# Patient Record
Sex: MALE | Race: WHITE | NOT HISPANIC OR LATINO | Employment: PART TIME | URBAN - METROPOLITAN AREA
[De-identification: names, ages, dates, MRNs, and addresses within clinical notes are randomized per-mention and may not be internally consistent; named-entity substitution may affect disease eponyms.]

---

## 2020-09-30 ENCOUNTER — HOSPITAL ENCOUNTER (EMERGENCY)
Facility: HOSPITAL | Age: 55
Discharge: HOME | End: 2020-09-30
Attending: EMERGENCY MEDICINE
Payer: MEDICARE

## 2020-09-30 VITALS
TEMPERATURE: 99 F | WEIGHT: 225 LBS | HEART RATE: 86 BPM | RESPIRATION RATE: 18 BRPM | SYSTOLIC BLOOD PRESSURE: 142 MMHG | OXYGEN SATURATION: 97 % | DIASTOLIC BLOOD PRESSURE: 76 MMHG | BODY MASS INDEX: 33.33 KG/M2 | HEIGHT: 69 IN

## 2020-09-30 DIAGNOSIS — M25.561 ACUTE PAIN OF RIGHT KNEE: Primary | ICD-10-CM

## 2020-09-30 DIAGNOSIS — R52 PAIN: ICD-10-CM

## 2020-09-30 PROCEDURE — 99284 EMERGENCY DEPT VISIT MOD MDM: CPT | Mod: 25

## 2020-09-30 PROCEDURE — 25000003 PHARM REV CODE 250: Performed by: NURSE PRACTITIONER

## 2020-09-30 PROCEDURE — 63600175 PHARM REV CODE 636 W HCPCS: Performed by: NURSE PRACTITIONER

## 2020-09-30 PROCEDURE — 96372 THER/PROPH/DIAG INJ SC/IM: CPT | Mod: 59

## 2020-09-30 RX ORDER — NAPROXEN 500 MG/1
500 TABLET ORAL EVERY 12 HOURS PRN
Qty: 14 TABLET | Refills: 0 | Status: SHIPPED | OUTPATIENT
Start: 2020-09-30

## 2020-09-30 RX ORDER — TRAMADOL HYDROCHLORIDE 50 MG/1
50 TABLET ORAL EVERY 6 HOURS PRN
Qty: 6 TABLET | Refills: 0 | Status: SHIPPED | OUTPATIENT
Start: 2020-09-30 | End: 2020-10-10

## 2020-09-30 RX ORDER — OXYCODONE AND ACETAMINOPHEN 5; 325 MG/1; MG/1
1 TABLET ORAL
Status: COMPLETED | OUTPATIENT
Start: 2020-09-30 | End: 2020-09-30

## 2020-09-30 RX ORDER — KETOROLAC TROMETHAMINE 30 MG/ML
60 INJECTION, SOLUTION INTRAMUSCULAR; INTRAVENOUS
Status: COMPLETED | OUTPATIENT
Start: 2020-09-30 | End: 2020-09-30

## 2020-09-30 RX ADMIN — OXYCODONE AND ACETAMINOPHEN 1 TABLET: 5; 325 TABLET ORAL at 08:09

## 2020-09-30 RX ADMIN — KETOROLAC TROMETHAMINE 60 MG: 30 INJECTION, SOLUTION INTRAMUSCULAR at 08:09

## 2020-10-01 NOTE — DISCHARGE INSTRUCTIONS
Take naproxen for pain control.  Utilize the Ultram for breakthrough pain.  Utilize crutches to help with walking.  It is imperative that she follow up with Orthopedics for further evaluation of this knee pain.  Follow-up with your primary care provider.  Return to the ER if symptoms acutely worsen.

## 2020-10-01 NOTE — ED PROVIDER NOTES
Encounter Date: 9/30/2020       History     Chief Complaint   Patient presents with    Knee Pain     RT, X SEVERAL WEEKS     55-year-old white male presents to the emergency department for evaluation of knee pain.  The patient has had pain in his right knee for the past 3 weeks.  He denies any accident or injury.  The patient has been evaluated by primary care after going to the urgent care and told to come to the emergency department for worsening symptoms.  He has complained of constant she 10/10 pain.  Nothing makes the pain worse or better.  The patient has taken naproxen, applied ice, and apply heat without relief of symptoms.  Denies any other symptoms under review of systems.  Patient has allergy to lisinopril.  Medical history of hypertension, coronary artery disease, mi, cardiac stents.  Current daily smoker.  Denies taking any routine medications.    The history is provided by the patient. No  was used.   Knee Pain  This is a new problem. The current episode started more than 1 week ago. The problem occurs constantly. The problem has not changed since onset.Pertinent negatives include no chest pain, no abdominal pain, no headaches and no shortness of breath. The symptoms are aggravated by walking and standing. Nothing relieves the symptoms. He has tried a cold compress and a warm compress (NSAID) for the symptoms. The treatment provided no relief.     Review of patient's allergies indicates:  No Known Allergies  Past Medical History:   Diagnosis Date    Coronary artery disease     Heart attack     Hypertension      Past Surgical History:   Procedure Laterality Date    HEART STENTS       No family history on file.  Social History     Tobacco Use    Smoking status: Current Every Day Smoker   Substance Use Topics    Alcohol use: Not Currently    Drug use: Not on file     Review of Systems   Constitutional: Negative for chills and fever.   HENT: Negative for rhinorrhea and sore  throat.    Respiratory: Negative for cough and shortness of breath.    Cardiovascular: Negative for chest pain.   Gastrointestinal: Negative for abdominal pain, diarrhea, nausea and vomiting.   Genitourinary: Negative for difficulty urinating.   Musculoskeletal: Positive for arthralgias. Negative for back pain, joint swelling and neck pain.   Skin: Negative for rash.   Neurological: Negative for dizziness, weakness and headaches.   All other systems reviewed and are negative.      Physical Exam     Initial Vitals [09/30/20 1720]   BP Pulse Resp Temp SpO2   133/71 74 18 98.4 °F (36.9 °C) 95 %      MAP       --         Physical Exam    Nursing note and vitals reviewed.  Constitutional: He appears well-developed and well-nourished. He is not diaphoretic. No distress.   HENT:   Head: Normocephalic and atraumatic.   Right Ear: External ear normal.   Left Ear: External ear normal.   Nose: Nose normal.   Eyes: Conjunctivae and EOM are normal. Right eye exhibits no discharge. Left eye exhibits no discharge.   Neck: Normal range of motion. Neck supple. No tracheal deviation present. No JVD present.   Cardiovascular: Normal rate, regular rhythm, normal heart sounds and intact distal pulses. Exam reveals no gallop and no friction rub.    No murmur heard.  Pulmonary/Chest: Breath sounds normal. No stridor. No respiratory distress. He has no wheezes. He has no rhonchi. He has no rales. He exhibits no tenderness.   Abdominal: Soft. Bowel sounds are normal. He exhibits no distension and no mass. There is no abdominal tenderness. There is no rebound and no guarding.   Musculoskeletal: Normal range of motion. No edema.      Right knee: He exhibits no bony tenderness. Tenderness found. Medial joint line and patellar tendon tenderness noted.        Legs:    Neurological: He is alert and oriented to person, place, and time. He has normal strength. No sensory deficit. GCS score is 15. GCS eye subscore is 4. GCS verbal subscore is 5. GCS  motor subscore is 6.   Skin: Skin is warm and dry. No rash noted. No erythema.   Psychiatric: He has a normal mood and affect. Thought content normal.         ED Course   Procedures  Labs Reviewed - No data to display       Imaging Results          X-Ray Knee 3 View Right (Final result)  Result time 09/30/20 17:49:00    Final result by Lizzie Stuart MD (09/30/20 17:49:00)                 Narrative:    EXAMINATION:  XR KNEE 3 VIEW RIGHT    CLINICAL HISTORY:  Pain, unspecified    TECHNIQUE:  Postoperative exam, right total knee arthroplasty    COMPARISON:  None.    FINDINGS:  Four views of the right knee show no fractures, dislocations or acute osseous abnormalities.  The joint spaces are maintained.  The soft tissues are unremarkable.    IMPRESSION  Negative right knee      Electronically signed by: Lizzie Stuart MD  Date:    09/30/2020  Time:    17:49                               Medical Decision Making:   Clinical Tests:   Radiological Study: Ordered and Reviewed  ED Management:  No acute findings were noted on patient's radiologic study.  I had a long discussion with the patient regarding follow-up with orthopedics for further radiologic evaluation.  The patient has been given prescriptions for naproxen and tramadol.  He understands he needs to use the medication sparingly.  He has also been fitted with a pair of crutches to aid with ambulation.  He has a knee brace at home that he will utilize for additional support.  Instructed to watch closely for any redness, swelling, inability to weightbear.  Should he notice any of this he will return to the ER for further evaluation.  I have discussed the case with Dr Espinoza, she approves of the plan of care.  Pt is being discharged and will f/u in clinic for further treatment and evaluation.                              Clinical Impression:       ICD-10-CM ICD-9-CM   1. Acute pain of right knee  M25.561 719.46   2. Pain  R52 780.96                       Disposition:   Disposition: Discharged  Condition: Stable     ED Disposition Condition    Discharge Stable        ED Prescriptions     Medication Sig Dispense Start Date End Date Auth. Provider    naproxen (NAPROSYN) 500 MG tablet Take 1 tablet (500 mg total) by mouth every 12 (twelve) hours as needed. 14 tablet 9/30/2020  Brandon Gentile III, NP    traMADoL (ULTRAM) 50 mg tablet Take 1 tablet (50 mg total) by mouth every 6 (six) hours as needed for Pain. 6 tablet 9/30/2020 10/10/2020 Brandon Gentile III, NP        Follow-up Information     Follow up With Specialties Details Why Contact Info Additional Information    Tamiko Dahl NP Family Medicine Call in 1 day To schedule follow-up [address] [facility] [address]  [phone]       [facility] Go to  As needed [address] [facility] [id number] [phone] 1st floor    Marvel Ma MD Sports Medicine, Orthopedic Surgery Call in 1 day To schedule follow-up [address] [facility]  [address]  [facility] [place] [facility] [address]  [phone]                                          Brandon Gentile III, NP  09/30/20 2053

## 2023-06-22 ENCOUNTER — OFFICE VISIT (OUTPATIENT)
Dept: URGENT CARE | Facility: CLINIC | Age: 58
End: 2023-06-22
Payer: MEDICARE

## 2023-06-22 VITALS
RESPIRATION RATE: 18 BRPM | HEIGHT: 69 IN | SYSTOLIC BLOOD PRESSURE: 148 MMHG | DIASTOLIC BLOOD PRESSURE: 92 MMHG | WEIGHT: 200 LBS | HEART RATE: 87 BPM | TEMPERATURE: 98 F | OXYGEN SATURATION: 97 % | BODY MASS INDEX: 29.62 KG/M2

## 2023-06-22 DIAGNOSIS — S16.1XXA STRAIN OF NECK MUSCLE, INITIAL ENCOUNTER: Primary | ICD-10-CM

## 2023-06-22 DIAGNOSIS — H61.23 BILATERAL IMPACTED CERUMEN: ICD-10-CM

## 2023-06-22 PROCEDURE — 99204 OFFICE O/P NEW MOD 45 MIN: CPT | Mod: 25,S$GLB,, | Performed by: NURSE PRACTITIONER

## 2023-06-22 PROCEDURE — 96372 THER/PROPH/DIAG INJ SC/IM: CPT | Mod: 59,S$GLB,, | Performed by: NURSE PRACTITIONER

## 2023-06-22 PROCEDURE — 96372 PR INJECTION,THERAP/PROPH/DIAG2ST, IM OR SUBCUT: ICD-10-PCS | Mod: 59,S$GLB,, | Performed by: NURSE PRACTITIONER

## 2023-06-22 PROCEDURE — 69210 EAR CERUMEN REMOVAL: ICD-10-PCS | Mod: S$GLB,,, | Performed by: NURSE PRACTITIONER

## 2023-06-22 PROCEDURE — 99204 PR OFFICE/OUTPT VISIT, NEW, LEVL IV, 45-59 MIN: ICD-10-PCS | Mod: 25,S$GLB,, | Performed by: NURSE PRACTITIONER

## 2023-06-22 PROCEDURE — 69210 REMOVE IMPACTED EAR WAX UNI: CPT | Mod: S$GLB,,, | Performed by: NURSE PRACTITIONER

## 2023-06-22 RX ORDER — MELOXICAM 7.5 MG/1
7.5 TABLET ORAL DAILY PRN
Qty: 7 TABLET | Refills: 0 | Status: SHIPPED | OUTPATIENT
Start: 2023-06-22 | End: 2023-06-29

## 2023-06-22 RX ORDER — CYCLOBENZAPRINE HCL 5 MG
5 TABLET ORAL NIGHTLY PRN
Qty: 10 TABLET | Refills: 0 | Status: SHIPPED | OUTPATIENT
Start: 2023-06-22 | End: 2023-07-02

## 2023-06-22 RX ORDER — KETOROLAC TROMETHAMINE 30 MG/ML
30 INJECTION, SOLUTION INTRAMUSCULAR; INTRAVENOUS
Status: COMPLETED | OUTPATIENT
Start: 2023-06-22 | End: 2023-06-22

## 2023-06-22 RX ADMIN — KETOROLAC TROMETHAMINE 30 MG: 30 INJECTION, SOLUTION INTRAMUSCULAR; INTRAVENOUS at 05:06
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
## 2023-06-22 NOTE — PROCEDURES
"Ear Cerumen Removal    Date/Time: 6/22/2023 4:30 PM  Performed by: BRENDA Salcido Jr.  Authorized by: BRENDA Salcido Jr.     Time out: Immediately prior to procedure a "time out" was called to verify the correct patient, procedure, equipment, support staff and site/side marked as required.    Consent Done?:  Yes (Verbal)    Local anesthetic:  None  Ceruminolytics applied: Ceruminolytics applied prior to the procedure    Ceruminolytic: Colace.  Location details:  Both ears  Procedure type: curette and irrigation    Cerumen  Removal Results:  Cerumen completely removed  Patient tolerance:  Patient tolerated the procedure well with no immediate complications     TMs unremarkable upon visualization after cerumen removal.  No hemotympanum seen  "

## 2023-06-22 NOTE — PATIENT INSTRUCTIONS
Increase clear fluid intake  May apply ice to affected area for 15 minutes every 2 hours.  After 48 hours may progress to moist heat or heating pad.  Take care not to fall sleep on heating pad as this may cause severe burns  Elevate area at rest  Take Mobic as directed.  Take each dose with a full meal.  Do not take any additional NSAIDs while taking Mobic.  You may take additional Tylenol as needed between doses    Take flexeril at bedtime for muscle spasms.  Follow-up primary care provider   Go directly to the ER if you experience any headaches, dizziness, vision disturbances, shortness of breath, chest pain, or other emergent concerns.  Return to clinic for new or worse symptoms